# Patient Record
Sex: MALE | Race: BLACK OR AFRICAN AMERICAN | Employment: FULL TIME | URBAN - METROPOLITAN AREA
[De-identification: names, ages, dates, MRNs, and addresses within clinical notes are randomized per-mention and may not be internally consistent; named-entity substitution may affect disease eponyms.]

---

## 2019-02-02 ENCOUNTER — OFFICE VISIT (OUTPATIENT)
Dept: URGENT CARE | Facility: CLINIC | Age: 24
End: 2019-02-02
Payer: COMMERCIAL

## 2019-02-02 VITALS
SYSTOLIC BLOOD PRESSURE: 143 MMHG | WEIGHT: 160 LBS | RESPIRATION RATE: 18 BRPM | OXYGEN SATURATION: 99 % | TEMPERATURE: 99 F | HEIGHT: 67 IN | BODY MASS INDEX: 25.11 KG/M2 | HEART RATE: 75 BPM | DIASTOLIC BLOOD PRESSURE: 93 MMHG

## 2019-02-02 DIAGNOSIS — M25.531 WRIST PAIN, ACUTE, RIGHT: Primary | ICD-10-CM

## 2019-02-02 PROCEDURE — 99214 OFFICE O/P EST MOD 30 MIN: CPT | Mod: S$GLB,,, | Performed by: FAMILY MEDICINE

## 2019-02-02 PROCEDURE — 3008F PR BODY MASS INDEX (BMI) DOCUMENTED: ICD-10-PCS | Mod: CPTII,S$GLB,, | Performed by: FAMILY MEDICINE

## 2019-02-02 PROCEDURE — 3008F BODY MASS INDEX DOCD: CPT | Mod: CPTII,S$GLB,, | Performed by: FAMILY MEDICINE

## 2019-02-02 PROCEDURE — 99214 PR OFFICE/OUTPT VISIT, EST, LEVL IV, 30-39 MIN: ICD-10-PCS | Mod: S$GLB,,, | Performed by: FAMILY MEDICINE

## 2019-02-02 RX ORDER — FLUOXETINE HYDROCHLORIDE 40 MG/1
40 CAPSULE ORAL DAILY
COMMUNITY

## 2019-02-02 NOTE — PROGRESS NOTES
"Subjective:       Patient ID: Alverto Valera Jr. is a 23 y.o. male.    Vitals:  height is 5' 7" (1.702 m) and weight is 72.6 kg (160 lb). His temperature is 98.9 °F (37.2 °C). His blood pressure is 143/93 (abnormal) and his pulse is 75. His respiration is 18 and oxygen saturation is 99%.     Chief Complaint: Hand Pain (rright wrist)    Patient presents with c/o of right wrist pain x 1 day. Patient will need a return to work note.  Pt states he fell on his hand last night. He states he doesn't believe its broken and doesn't want an xray.   He just wants a note to rtw.       Hand Pain    The incident occurred 12 to 24 hours ago. The incident occurred in the street. There was no injury mechanism. The pain is present in the right wrist. The quality of the pain is described as aching. The pain is mild. The pain has been fluctuating since the incident. The symptoms are aggravated by movement and lifting. He has tried nothing for the symptoms. The treatment provided no relief.       Constitution: Negative for fatigue.   HENT: Negative for facial swelling and facial trauma.    Neck: Negative for neck stiffness.   Cardiovascular: Negative for chest trauma.   Eyes: Negative for eye trauma, double vision and blurred vision.   Gastrointestinal: Negative for abdominal trauma, abdominal pain and rectal bleeding.   Genitourinary: Negative for hematuria, genital trauma and pelvic pain.   Musculoskeletal: Positive for pain, trauma and abnormal ROM of joint. Negative for joint swelling and pain with walking.   Skin: Negative for color change, wound, abrasion and laceration.   Neurological: Negative for dizziness, history of vertigo, light-headedness, coordination disturbances, altered mental status and loss of consciousness.   Hematologic/Lymphatic: Negative for history of bleeding disorder.   Psychiatric/Behavioral: Negative for altered mental status.       Objective:      Physical Exam   Constitutional: He appears " well-developed and well-nourished.   Musculoskeletal:        Right wrist: He exhibits tenderness (marked tenderness at  distal ulna and over the triquetrium, pisiform . there is pain with valgus strain of the Triangular Fibrocartilage Complex.), bony tenderness (tenderness at distal ulnar on volar surface. no obvious deformity. , no ecchymosis.) and swelling. He exhibits no crepitus and no deformity.        Nursing note and vitals reviewed.      Assessment:       1. Wrist pain, acute, right      2 possible fracture  Plan:         Wrist pain, acute, right      Wrist splint applied. Note provided for work at pts request.   I advised pt that I recommeded and xray to rule out a fracture. Unfortunately our machine is down which pt was told before he checked in. He was advised  Before he checked in - I recommend he go down to our nearby  office and offered to try to make arrangements for this but he declines that offer.   Recommend ice and rest and further evaluation if he is not feeling better in a few days .

## 2019-02-02 NOTE — LETTER
February 2, 2019      [facility]  [address] [address]  Phone: [phone]  Fax: [phone]       Patient: Alverto Valera   YOB: 1995  Date of Visit: 02/02/2019    To Whom It May Concern:    Rosalinda Valera  was at [facility] on 02/02/2019. He may return to work/school on 2/4/2019 with no restrictions. If you have any questions or concerns, or if I can be of further assistance, please do not hesitate to contact me.    Sincerely,    Bryanna Ray, RT